# Patient Record
Sex: MALE | Race: WHITE | NOT HISPANIC OR LATINO | URBAN - NONMETROPOLITAN AREA
[De-identification: names, ages, dates, MRNs, and addresses within clinical notes are randomized per-mention and may not be internally consistent; named-entity substitution may affect disease eponyms.]

---

## 2017-01-20 ENCOUNTER — HOSPITAL ENCOUNTER (OUTPATIENT)
Dept: GENERAL RADIOLOGY | Facility: HOSPITAL | Age: 57
Discharge: HOME | End: 2017-01-20

## 2017-01-20 ENCOUNTER — TRANSCRIBE ORDERS (OUTPATIENT)
Dept: ADMINISTRATIVE | Facility: HOSPITAL | Age: 57
End: 2017-01-20

## 2017-01-20 DIAGNOSIS — Z00.00 ANNUAL PHYSICAL EXAM: Primary | ICD-10-CM

## 2017-01-20 DIAGNOSIS — Z00.00 ANNUAL PHYSICAL EXAM: ICD-10-CM

## 2017-01-20 PROCEDURE — 71010 HC CHEST PA OR AP: CPT

## 2017-06-23 ENCOUNTER — HOSPITAL ENCOUNTER (OUTPATIENT)
Dept: HOSPITAL 58 - AMBL | Age: 57
Discharge: ANOTHER HOSPITAL | End: 2017-06-23
Attending: FAMILY MEDICINE

## 2017-06-23 ENCOUNTER — HOSPITAL ENCOUNTER (EMERGENCY)
Dept: HOSPITAL 58 - ED | Age: 57
LOS: 1 days | Discharge: LEFT BEFORE DISCHARGE | End: 2017-06-24

## 2017-06-23 VITALS — DIASTOLIC BLOOD PRESSURE: 61 MMHG | SYSTOLIC BLOOD PRESSURE: 118 MMHG | TEMPERATURE: 98 F

## 2017-06-23 VITALS — BODY MASS INDEX: 37.5 KG/M2

## 2017-06-23 DIAGNOSIS — R55: ICD-10-CM

## 2017-06-23 DIAGNOSIS — F10.129: Primary | ICD-10-CM

## 2017-06-23 DIAGNOSIS — R55: Primary | ICD-10-CM

## 2017-06-23 LAB
ALBUMIN SERPL-MCNC: 3.7 G/DL (ref 3.4–5)
ALBUMIN/GLOB SERPL: 1.28 {RATIO}
ALP SERPL-CCNC: 67 U/L (ref 50–136)
ALT SERPL-CCNC: 20 U/L (ref 12–78)
ANION GAP SERPL CALC-SCNC: 17.8 MMOL/L
APAP SERPL-MCNC: < 3 UG/ML (ref 10–30)
ARTERIAL PATENCY WRIST A: (no result)
AST SERPL-CCNC: 16 U/L (ref 15–37)
BASE EXCESS STD BLDA CALC-SCNC: -3 MMOL/L (ref -2–2)
BASOPHILS # BLD AUTO: 0 K/UL (ref 0–0.2)
BASOPHILS NFR BLD AUTO: 0.3 % (ref 0–3)
BILIRUB SERPL-MCNC: 0.4 MG/DL (ref 0–1.2)
BUN SERPL-MCNC: 13 MG/DL (ref 7–18)
BUN/CREAT SERPL: 13.54
CALCIUM SERPL-MCNC: 9.3 MG/DL (ref 8.2–10.2)
CHLORIDE SERPL-SCNC: 103 MMOL/L (ref 98–107)
CK MB SERPL-MCNC: 3.5 NG/ML (ref 0–3.6)
CK SERPL-CCNC: 171 U/L
CO2 BLD-SCNC: 20 MMOL/L (ref 21–32)
CO2 BLDA CALC-SCNC: 24 MMOL/L (ref 22–28)
CREAT SERPL-MCNC: 0.96 MG/DL (ref 0.6–1.1)
EOSINOPHIL # BLD AUTO: 0.1 K/UL (ref 0–0.7)
EOSINOPHIL NFR BLD AUTO: 0.5 % (ref 0–7)
GFR SERPLBLD BASED ON 1.73 SQ M-ARVRAT: 80 ML/MIN
GLOBULIN SER CALC-MCNC: 2.9 G/L
GLUCOSE SERPL-MCNC: 167 MG/DL (ref 70–100)
HCO3 BLDA-SCNC: 22.9 MMOL/L (ref 22–26)
HCT VFR BLD AUTO: 48.3 % (ref 42–52)
HGB BLD-MCNC: 16.4 G/DL (ref 14–18)
IMM GRANULOCYTES NFR BLD AUTO: 0.4 % (ref 0–5)
INHALED O2 CONCENTRATION: 21 %
LYMPHOCYTES # SPEC AUTO: 4.5 K/UL (ref 0.6–3.4)
LYMPHOCYTES NFR BLD AUTO: 29.2 % (ref 10–50)
MCH RBC QN: 27.9 PG (ref 27–31)
MCHC RBC AUTO-ENTMCNC: 34 G/DL (ref 31.8–35.4)
MCV RBC: 82.1 FL (ref 80–94)
MONOCYTES # BLD AUTO: 1 K/UL (ref 0.4–2)
MONOCYTES NFR BLD AUTO: 6.2 % (ref 0–10)
NEUTROPHILS # BLD AUTO: 9.8 K/UL (ref 2–6.9)
NEUTROPHILS NFR BLD AUTO: 63.4 %
PCO2 BLDA: 43.2 MMHG (ref 35–45)
PH BLDA: 7.33 [PH] (ref 7.35–7.45)
PLATELET # BLD AUTO: 241 10^3/UL (ref 140–440)
PO2 BLDA: 66 MMHG (ref 85–100)
POTASSIUM SERPL-SCNC: 3.8 MMOL/L (ref 3.5–5.1)
PROT SERPL-MCNC: 6.6 G/DL (ref 6.4–8.2)
RBC # BLD AUTO: 5.88 10^6/UL (ref 4.7–6.1)
SALICYLATES SERPL-MCNC: < 5 MG/DL (ref 2.8–20)
SAO2 % BLDA FROM PO2: 91 % (ref 95–100)
SODIUM SERPL-SCNC: 137 MMOL/L (ref 136–145)
WBC # BLD AUTO: 15.43 K/UL (ref 4.2–10.2)

## 2017-06-23 PROCEDURE — 80306 DRUG TEST PRSMV INSTRMNT: CPT

## 2017-06-23 PROCEDURE — 99284 EMERGENCY DEPT VISIT MOD MDM: CPT

## 2017-06-23 PROCEDURE — 96360 HYDRATION IV INFUSION INIT: CPT

## 2017-06-23 PROCEDURE — 82550 ASSAY OF CK (CPK): CPT

## 2017-06-23 PROCEDURE — 82803 BLOOD GASES ANY COMBINATION: CPT

## 2017-06-23 PROCEDURE — 82553 CREATINE MB FRACTION: CPT

## 2017-06-23 PROCEDURE — 36415 COLL VENOUS BLD VENIPUNCTURE: CPT

## 2017-06-23 PROCEDURE — 80307 DRUG TEST PRSMV CHEM ANLYZR: CPT

## 2017-06-23 PROCEDURE — 84484 ASSAY OF TROPONIN QUANT: CPT

## 2017-06-23 PROCEDURE — 96361 HYDRATE IV INFUSION ADD-ON: CPT

## 2017-06-23 PROCEDURE — 85025 COMPLETE CBC W/AUTO DIFF WBC: CPT

## 2017-06-23 PROCEDURE — 80053 COMPREHEN METABOLIC PANEL: CPT

## 2017-06-23 PROCEDURE — 93005 ELECTROCARDIOGRAM TRACING: CPT

## 2017-06-23 PROCEDURE — 93010 ELECTROCARDIOGRAM REPORT: CPT

## 2017-06-23 PROCEDURE — 81001 URINALYSIS AUTO W/SCOPE: CPT

## 2017-06-23 RX ADMIN — SODIUM CHLORIDE STA MLS/HR: 0.9 INJECTION, SOLUTION INTRAVENOUS at 22:18

## 2017-06-23 RX ADMIN — Medication PRN SYR: at 22:17

## 2017-06-23 NOTE — DI
EXAM: Chest, one-view 

  

  

  

HISTORY:  Syncope 

  

  

  

FINDINGS:  Cardiac and mediastinal contours are normal.  Pulmonary vasculature is normal.  Lungs are
 clear.  Bony thorax is unremarkable. 

  

IMPRESSION:  Within normal limits

## 2017-06-23 NOTE — ED.PDOC
General


ED Provider: 


Dr. CHON ROBERTS-ER





Chief Complaint: Alcohol Intoxication


Stated Complaint: i passed out


Time Seen by Physician: 22:10


Mode of Arrival: Ambulance


Information Source: Patient


Exam Limitations: No limitations


Nursing and Triage Documentation Reviewed and Agree: Yes





Neurological Complaint Exam





- Syncope/Near Syncope Complaint/Exam


Onset/Duration: 30 min ago


Symptoms Are: Resolved


Episodes Lasting: Seconds


Number of Episodes: 1


Frequency of Episodes: 1


Episodes Witnessed: Yes


Loss of Consciousness: Yes


Associated Head Trauma: Yes


Activity at Onset: At rest


Aggravating: None


Alleviating: Reports: None


Associated Signs and Symptoms: Denies: Pain, Decreased oral intake, Vomiting, 

Diarrhea, GI blood loss, Short of air, Chest pain, Palpitations, Diaphoresis, 

Lightheadedness, Dizziness, Weakness, AMS, Numbness, Headache, Seizure, Remote 

head trauma, Recent head trauma


JVD Present: No


Carotid Bruit Present: No


Nystagmus Present: No


Gag Reflex Present: Yes


Meningeal Signs Positive: No


Focal Weakness: Present: None


Focal Sensory Loss: Present: None


Gait: Normal


Finger-to-Nose: Normal Findings


Romberg Test Positive: Yes


Babinski Sign: Negative Right, Negative Left


Heel to Toe Normal: No


Fermin-Hallpike Test Positive: No


Differential Diagnoses: Dysrhythmia, Hypovolemia, Vasovagal Episode


Quality Indicator For Non-Traumatic Chest Pain/Syncope: EKG Performed





Review of Systems





- Review Of Systems


Constitutional: Reports: No symptoms


Eyes: Reports: No symptoms


Ears, Nose, Mouth, Throat: Reports: No symptoms


Respiratory: Reports: No symptoms


Cardiac: Reports: Syncope


GI: Reports: No symptoms


: Reports: No symptoms


Musculoskeletal: Reports: No symptoms


Skin: Reports: No symptoms


Neurological: Reports: No symptoms


Endocrine: Reports: No symptoms


Hematologic/Lymphatic: Reports: No symptoms


All Other Systems: Reviewed and Negative





Past Medical History





- Past Medical History


Endocrine: Reports: Unknown


Cardiovascular: Reports: Unknown


Respiratory: Reports: Unknown


Hematological: Reports: Unknown


Gastrointestinal: Reports: Unknown


Genitourinary: Reports: Unknown


Neuro/Psych: Reports: Unknown


Musculoskeletal: Reports: Unknown


Cancer: Reports: Unknown





- Surgical History


General Surgical History: Reports: Unknown





- Family History


Family History: Reports: Unknown





Physical Exam





- Physical Exam


Appearance: Well-appearing, No pain distress, Well-nourished


Eyes: CAMERON, EOMI, Conjunctiva clear


ENT: Ears normal


Neck: Supple


Respiratory: Airway patent, Breath sounds clear, Breath sounds equal, 

Respirations nonlabored


Cardiovascular: RRR, Pulses normal, No rub, No murmur


GI/: Soft, Nontender, No masses, Bowel sounds normal, No Organomegaly


Musculoskeletal: Normal strength, ROM intact, No edema, No calf tenderness


Skin: Warm, Dry, Normal color


Neurological: Sensation intact, Motor intact, Reflexes intact, Cranial nerves 

intact, Alert, Oriented


Psychiatric: Affect appropriate, Mood appropriate, Anxious





Interpretation





- Radiology Interpretation


Radiology Interpretation By: ED Physician


Exam Interpreted: CXR, CT Scan





- EKG Interpretation


Time of EKG #1: 00:34


Rate: Normal


Rhythm: Sinus


Ectopy: None


Axis: NL


ST Segment: Normal





Critical Care Note





- Critical Care Note


Total Time (mins): 0





Course





- Course


Hematology/Chemistry: 


 06/23/17 22:09





 06/23/17 10:10


Orders, Labs, Meds: 


Lab Review











  06/23/17 06/23/17 06/24/17





  10:10 22:09 00:07


 


WBC   15.43 H 


 


RBC   5.88 


 


Hgb   16.4 


 


Hct   48.3 


 


MCV   82.1 


 


MCH   27.9 


 


MCHC   34.0 


 


RDW Coeff of Akil   13.1 


 


Plt Count   241 


 


Immature Gran % (Auto)   0.4 


 


Neut % (Auto)   63.4 


 


Lymph % (Auto)   29.2 


 


Mono % (Auto)   6.2 


 


Eos % (Auto)   0.5 


 


Baso % (Auto)   0.3 


 


Immature Gran # (Auto)   0.1 


 


Neut #   9.8 H 


 


Lymph #   4.5 H 


 


Mono #   1.0 


 


Eos #   0.1 


 


Baso #   0.0 


 


Puncture Site  Lb  


 


O2 Saturation  91.0 L  


 


ABG pH  7.332 L  


 


ABG pCO2  43.2  


 


ABG pO2  66.0 L  


 


ABG HCO3  22.9  


 


ABG Total CO2  24  


 


ABG Base Excess  -3 L  


 


Ace Test  +  


 


FiO2 %  21.0  


 


Sodium  137  


 


Potassium  3.8  


 


Chloride  103  


 


Carbon Dioxide  20 L  


 


Anion Gap  17.8  


 


BUN  13  


 


Creatinine  0.96  


 


Estimated GFR (MDRD)  80.00  


 


BUN/Creatinine Ratio  13.54  


 


Glucose  167 H  


 


Calcium  9.3  


 


Total Bilirubin  0.40  


 


AST  16  


 


ALT  20  


 


Alkaline Phosphatase  67  


 


Total Creatine Kinase  171  


 


CK-MB (CK-2)  3.5  


 


CK-MB (CK-2) %  2.27773  


 


Troponin I  < 0.0100  


 


Total Protein  6.6  


 


Albumin  3.7  


 


Globulin  2.9  


 


Albumin/Globulin Ratio  1.28  


 


Urine Color    Laura


 


Urine Clarity    Clear


 


Urine pH    5.5


 


Ur Specific Gravity    >=1.030


 


Urine Protein    3+


 


Urine Glucose (UA)    Negative


 


Urine Ketones    Negative


 


Urine Blood    Trace-intact


 


Urine Nitrite    Negative


 


Urine Bilirubin    1+


 


Urine Urobilinogen    0.2


 


Ur Leukocyte Esterase    Negative


 


Urine Microscopic RBC    0-2


 


Ur Squamous Epith Cells    2-5


 


Amorphous Sediment    3+


 


Hyaline Casts    2-5


 


Granular Casts    2-5


 


Salicylate Level mg/dL  < 5.0  


 


Urine Opiates Screen    Negative


 


Ur Oxycodone Screen    Negative


 


Urine Methadone Screen    Negative


 


Ur Propoxyphene Screen    Negative


 


Acetaminophen  < 3 L  


 


Ur Barbiturates Screen    Negative


 


U Tricyclic Antidepress    Negative


 


Ur Phencyclidine Scrn    Negative


 


Ur Amphetamine Screen    Negative


 


U Methamphetamines Scrn    Negative


 


U Benzodiazepines Scrn    Negative


 


Urine Cocaine Screen    Negative


 


U Cannabinoids Screen    Negative


 


Plasma/Serum Alcohol  141.4 H  








Orders











 Category Date Time Status


 


 ABG DRAW REQUEST Stat CARDIO  06/23/17 22:09 Completed


 


 EKG-(ED ONLY) Stat CARDIO  06/23/17 22:09 Completed


 


 Cardiac Monitor [ED CARDIAC MONITOR APPLIED] .ONCE EMERGENCY  06/23/17 22:12 

Active


 


 ED IV/MEDIPORT/POWERPORT .ONCE EMERGENCY  06/23/17 22:10 Active


 


 ABG Stat LAB  06/23/17 10:10 Completed


 


 BLOOD ALCOHOL Stat LAB  06/23/17 10:10 Completed


 


 CBC W/ AUTO DIFF Stat LAB  06/23/17 22:09 Completed


 


 COMPREHENSIVE METABOLIC PANEL Stat LAB  06/23/17 10:10 Completed


 


 CREATINE KINASE Stat LAB  06/23/17 10:10 Completed


 


 SALICYLATE Stat LAB  06/23/17 10:10 Completed


 


 TROPONIN I Stat LAB  06/23/17 10:10 Completed


 


 TYLENOL LEVEL [ACETAMINOPHEN] Stat LAB  06/23/17 10:10 Completed


 


 URINALYSIS C & S IF INDICATED Stat LAB  06/24/17 00:07 Completed


 


 URINE DRUG SCREEN (RAPID FOR ED) [DRUG SCREEN, URINE, LAB  06/24/17 00:07 

Completed





 RAPID] Stat   


 


 0.9 % Sodium Chloride [Saline Flush] MEDS  06/23/17 22:10 Ordered





 1 syr IVF PRN PRN   


 


 Sodium Chloride 0.9% [Sodium Chloride] 1,000 ml MEDS  06/23/17 22:10 Active





  mls/hr   


 


 CHEST, 1V AP ONLY Stat RADS  06/23/17 22:11 Completed


 


 CT CERVICAL SPINE W/O CONTRAST Stat RADS  06/23/17 22:11 Completed


 


 CT HEAD W/O CONTRAST Stat RADS  06/23/17 22:11 Completed








Medications











Generic Name Dose Route Start Last Admin





  Trade Name Rossq  PRN Reason Stop Dose Admin


 


Sodium Chloride  1,000 mls @ 100 mls/hr  06/23/17 22:10  06/23/17 22:18





  Sodium Chloride  IV  06/24/17 08:09  100 mls/hr





  .Q10H STA   Administration


 


Sodium Chloride  1 syr  06/23/17 22:10  06/23/17 22:17





  Saline Flush  IVF   1 syr





  PRN PRN   Administration





  To flush IV   








i wantd to observe mr mccoy longer but he was insistent to leave--i warned him 

of possible harm including death but he was insistent to leave so he left ama


Vital Signs: 


 











  Temp Pulse Resp BP Pulse Ox


 


 06/23/17 22:09  98 F  84  20  118/61  94 L














Departure





- Departure


Time of Disposition: 00:34


Disposition: AMA


Discharge Problem: 


 Alcohol intoxication





Instructions:  Syncope (ED)


Condition: Good


Pt referred to PMD for follow-up: No


Additional Instructions: 


return prn


Allergies/Adverse Reactions: 


Allergies





No Known Drug Allergies Adverse Reaction (Verified 06/23/17 22:17)


 








Home Medications: 


Ambulatory Orders





Albuterol Sulfate [Proventil Hfa] 2 inh INH TID PRN 06/23/17 


Fluticasone/Salmeterol [Advair 250-50 Diskus] 1 inh INH BID 06/23/17 








Disposition Discussed With: Patient

## 2017-06-23 NOTE — CT
EXAM:  CT cervical spine without intravenous contrast 06/23/2017.  Sagittal and coronal reformatted 
images obtained 

  

HISTORY:  Trauma 

  

COMPARISON:  None. 

  

FINDINGS:  Straightening of the normal cervical lordosis. 

  

There is multilevel chronic degenerative disc disease.  Severe degenerative disc disease at C4-C5, C
5-C6, C6-C7 and C7-T1.  Severe loss of intervertebral disc height with bulky anterior and posterior 
osteophyte formation. 

  

There is multilevel severe spinal and neural foraminal stenosis. 

  

There is no evidence of acute fracture or subluxation. 

  

IMPRESSION: 

1.  Severe degenerative disc disease.  Multilevel severe spinal and neural foraminal stenosis. 

2.  No acute post traumatic osseous abnormality. 

3.  If clinically indicated outpatient MRI may be of benefit.

## 2017-06-23 NOTE — CT
EXAM: CT brain without contrast 

  

  

  

HISTORY:  Syncope 

  

  

  

TECHNIQUE:  CT of the brain without intravenous contrast 

  

FINDINGS:  There is no acute hemorrhage midline shift or mass effect.  No hydrocephalus or abnormal 
extra-axial fluid collection.  No significant parenchymal attenuation abnormality.  The bony cranium
 appears normal. The visualized paranasal sinuses are clear. Soft tissues without significant abnorm
ality. 

  

IMPRESSION: 

1.  CT of the brain within normal limits.

## 2017-06-24 LAB
ADD URINE MICROSCOPIC: YES
BILIRUB UR QL STRIP.AUTO: (no result)
COCAINE UR QL SCN: NEGATIVE
GRAN CASTS URNS QL MICRO: (no result)
PH UR: 5.5 [PH] (ref 5–9)
PROT ?TM UR QN: (no result) G
SP GR UR: >=1.03 (ref 1–1.03)